# Patient Record
(demographics unavailable — no encounter records)

---

## 2018-08-11 NOTE — ERPHSYRPT
- History of Present Illness


Time Seen by Provider: 08/11/18 19:58


Source: patient, family


Exam Limitations: no limitations


Patient Subjective Stated Complaint: pt states in july her horse fell on her 

leg and her knee has been hurting her since and is not getting better. states 

she had xrays done after, no ct or mri since


Triage Nursing Assessment: pt alert and oriented, answers questions approp. pt 

ambulatoryw ith slightly limping gait ntoed. respirations nonlabored with lungs 

cta. tenderness to lt knee. pedal pulse and cap refill to lle wnl.


Physician History: 





pt had  neg x-ray by her report after injury days ago; but swelling has now 

recurred after being up on feet at work and now has catching of the joint- 

using knee brace already., no additional trauma since original injury  days ago.





explained potential for  DVT  to pt and mother , but also negative signs - neg 

homas and no erythema and swelling is effusion only at knee joint thus they 

reasonably chose to hold off on US as less likely DVT,  but are aware risk 

continues and could devlope later and will return meantime if any signs. 





positive drawer anterior, effusin present; neuro vasc intact distal LLE. 


Method of Injury: fell, sports injury


Occurred: days ago


Quality: aching, sharpness


Severity of Pain-Max: moderate


Severity of Pain-Current: moderate


Lower Extremities Pain: knee: left


Modifying Factors: Improves With: movement


Associated Symptoms: popping sensation


Allergies/Adverse Reactions: 








levofloxacin [From Levaquin] Allergy (Verified 08/11/18 19:42)


 


Penicillins Allergy (Verified 08/11/18 19:42)


 


sulfamethoxazole [From Bactrim] Allergy (Verified 08/11/18 19:42)


 


trimethoprim [From Bactrim] Allergy (Verified 08/11/18 19:42)


 





Hx Tetanus, Diphtheria Vaccination/Date Given: Yes (2017)


Hx Influenza Vaccination/Date Given: No


Hx Pneumococcal Vaccination/Date Given: No


Immunizations Up to Date: Yes





- Review of Systems


Constitutional: No Fever, No Chills


Eyes: No Symptoms


Ears, Nose, & Throat: No Symptoms


Respiratory: No Cough, No Dyspnea


Cardiac: No Chest Pain, No Edema, No Syncope


Abdominal/Gastrointestinal: No Abdominal Pain, No Nausea, No Vomiting, No 

Diarrhea


Genitourinary Symptoms: No Dysuria


Musculoskeletal: Joint Pain, Joint Swelling, No Back Pain, No Neck Pain


Skin: No Rash


Neurological: No Dizziness, No Focal Weakness, No Sensory Changes


Psychological: No Symptoms


Endocrine: No Symptoms


All Other Systems: Reviewed and Negative





- Past Medical History


Pertinent Past Medical History: No


Neurological History: No Pertinent History


ENT History: No Pertinent History


Cardiac History: No Pertinent History


Respiratory History: No Pertinent History


Endocrine Medical History: No Pertinent History


Musculoskeletal History: No Pertinent History


GI Medical History: No Pertinent History


 History: No Pertinent History


Psycho-Social History: No Pertinent History


Female Reproductive Disorders: No Pertinent History





- Past Surgical History


Past Surgical History: Yes


Other Surgical History: sinus surgery in june





- Social History


Smoking Status: Never smoker


Exposure to second hand smoke: No


Drug Use: none


Patient Lives Alone: No





- Female History


Hx Last Menstrual Period: iud


Hx Pregnant Now: No





- Nursing Vital Signs


Nursing Vital Signs: 


 Initial Vital Signs











Temperature  98.6 F   08/11/18 19:32


 


Pulse Rate  107 H  08/11/18 19:32


 


Respiratory Rate  16   08/11/18 19:32


 


Blood Pressure  141/84   08/11/18 19:32


 


O2 Sat by Pulse Oximetry  98   08/11/18 19:32








 Pain Scale











Pain Intensity                 9

















- Physical Exam


General Appearance: alert


Eyes, Ears, Nose, Throat Exam: moist mucous membranes


Neck Exam: non-tender, supple


Cardiovascular/Respiratory Exam: chest non-tender, normal breath sounds, 

regular rate/rhythm, no respiratory distress


Gastrointestinal/Abdominal Exam: non-tender, guarding


Back Exam: normal inspection, No vertebral tenderness


Hips Exam: bilateral: non-tender, normal inspection, normal range of motion, no 

evidence of injury


Legs Exam: bilateral leg: non-tender, normal inspection, normal range of motion

, no evidence of injury


Knees Exam: right knee: non-tender, normal inspection, normal range of motion, 

no evidence of injury, left knee: joint effusion, pain, swelling


Ankle Exam: bilateral ankle: non-tender, normal inspection, normal range of 

motion, no evidence of injury


Foot Exam: bilateral foot: non-tender, normal inspection, normal range of motion

, no evidence of injury


DTR - Lower Extremities Exam: knee (R): 2+, knee (L): 2+, ankle (R): 2+, ankle (

L): 2+


Neuro/Tendon Exam: normal sensation, normal motor functions


Mental Status Exam: alert, oriented x 3, cooperative


Skin Exam: normal color, warm, dry


**SpO2 Interpretation**: normal


SpO2: 98


Oxygen Delivery: Room Air





- Course


Nursing assessment & vital signs reviewed: Yes


Ordered Tests: 


 Active Orders 24 hr











 Category Date Time Status


 


 Crutches STAT Care  08/11/18 20:15 Ordered








Medication Summary














Discontinued Medications














Generic Name Dose Route Start Last Admin





  Trade Name Terri  PRN Reason Stop Dose Admin


 


Hydrocodone Bitart/Acetaminophen  1 tab  08/11/18 20:16  





  Norco 5/325 Mg***  PO  08/11/18 20:17  





  SENT HOME W/ PATIENT ONE   





     





     





     





     














- Progress


Progress: improved, re-examined


Progress Note: 





08/11/18 20:08


the need for additional x-ray was discussed and pt/family agree not indicated 

with lack of additional trauma and will have  Dr. Mcgrath arrange MRI and ORtho 

on Monday. 


Counseled pt/family regarding: diagnosis, need for follow-up





- Departure


Time of Disposition: 20:09


Departure Disposition: Home


Clinical Impression: 


 Knee cartilage, torn, left





Condition: Good


Critical Care Time: No


Referrals: 


LADONNA MCGRATH [Primary Care Provider] - 


Instructions:  Anterior Cruciate Ligament Tear (DC), Meniscal Tear (DC), 

Ligament Injuries in the Knee (DC)


Additional Instructions: 


followup with Dr. Mcgrath for MRI and Ortho referral; return meantime if 

increased swelling redness or other concerns. stay off of feet as much as 

possible and use ice to help swelling; it is good to keep moving toes as there 

is an increased risk for blood clots with immobilizing injuries, and this 

movement helps reduce risk. return meantime if any concerns. follow up with 

your dr to also recheck blood pressure. 


Prescriptions: 


Hydrocodone/Acetaminophen [Norco 5-325 Tablet] 1 each PO Q4-6HPRN PRN #10 

tablet MDD 4/day


 PRN Reason: Pain